# Patient Record
Sex: MALE | Race: WHITE | NOT HISPANIC OR LATINO | Employment: OTHER | URBAN - METROPOLITAN AREA
[De-identification: names, ages, dates, MRNs, and addresses within clinical notes are randomized per-mention and may not be internally consistent; named-entity substitution may affect disease eponyms.]

---

## 2019-12-11 RX ORDER — TAMSULOSIN HYDROCHLORIDE 0.4 MG/1
0.4 CAPSULE ORAL DAILY
Refills: 3 | COMMUNITY
Start: 2019-09-09

## 2019-12-11 RX ORDER — MELOXICAM 15 MG/1
15 TABLET ORAL DAILY
Refills: 0 | COMMUNITY
Start: 2019-09-05

## 2019-12-11 RX ORDER — VARENICLINE TARTRATE 1 MG/1
TABLET, FILM COATED ORAL
Refills: 2 | COMMUNITY
Start: 2019-10-17

## 2019-12-11 RX ORDER — CABERGOLINE 0.5 MG/1
TABLET ORAL
Refills: 3 | COMMUNITY
Start: 2019-11-19

## 2019-12-11 RX ORDER — MONTELUKAST SODIUM 10 MG/1
10 TABLET ORAL EVERY EVENING
Refills: 1 | COMMUNITY
Start: 2019-11-18

## 2019-12-11 RX ORDER — FLUTICASONE PROPIONATE 50 MCG
SPRAY, SUSPENSION (ML) NASAL
Refills: 2 | COMMUNITY
Start: 2019-09-06

## 2019-12-11 RX ORDER — HYDROMORPHONE HYDROCHLORIDE 2 MG/1
2 TABLET ORAL 3 TIMES DAILY PRN
Refills: 0 | COMMUNITY
Start: 2019-11-08

## 2019-12-11 RX ORDER — CLINDAMYCIN HYDROCHLORIDE 150 MG/1
CAPSULE ORAL
Refills: 0 | COMMUNITY
Start: 2019-10-31

## 2019-12-12 ENCOUNTER — PREP FOR PROCEDURE (OUTPATIENT)
Dept: GASTROENTEROLOGY | Facility: CLINIC | Age: 59
End: 2019-12-12

## 2019-12-12 ENCOUNTER — OFFICE VISIT (OUTPATIENT)
Dept: GASTROENTEROLOGY | Facility: CLINIC | Age: 59
End: 2019-12-12
Payer: COMMERCIAL

## 2019-12-12 VITALS
DIASTOLIC BLOOD PRESSURE: 66 MMHG | HEIGHT: 63 IN | WEIGHT: 186.8 LBS | BODY MASS INDEX: 33.1 KG/M2 | HEART RATE: 101 BPM | SYSTOLIC BLOOD PRESSURE: 98 MMHG

## 2019-12-12 DIAGNOSIS — K21.00 GASTROESOPHAGEAL REFLUX DISEASE WITH ESOPHAGITIS: ICD-10-CM

## 2019-12-12 DIAGNOSIS — R19.4 CHANGE IN BOWEL HABITS: Primary | ICD-10-CM

## 2019-12-12 PROCEDURE — 99213 OFFICE O/P EST LOW 20 MIN: CPT | Performed by: PHYSICIAN ASSISTANT

## 2019-12-12 RX ORDER — SIMVASTATIN 40 MG
TABLET ORAL
COMMUNITY
Start: 2019-12-11

## 2019-12-12 NOTE — PROGRESS NOTES
[facility] - Outpatient Consultation  Cy Remedies Ronnie 66 y o  male MRN: [id number]  Encounter: [id number]          ASSESSMENT AND PLAN:      1  Change in bowel habits  Update EGD and colonoscopy  Increase fiber supplement      ______________________________________________________________________    HPI:  66year old male presents for evaluation of change in bowel habits  He reports that over the past few months the stool caliber has decreased  He is taking fiber once daily  He denies diarrhea or constipation  He admits to a worsening left sided lower back pain which seems to improve after a BM  There is no abdominal pain or fevers  His last colonoscopy was in 2017 with several tubular adenomas removed  Prior to this he had a colonoscopy in 2015 with a large tubular adenoma removed and multiple other tubular adenomas removed  He also had an upper endoscopy in 2015 and 2017 with visualized Richey's esophagus however biopsies were negative  REVIEW OF SYSTEMS:    CONSTITUTIONAL: Denies any fever, chills, rigors, and weight loss  HEENT: No earache or tinnitus  Denies hearing loss or visual disturbances  CARDIOVASCULAR: No chest pain or palpitations  RESPIRATORY: Denies any cough, hemoptysis, shortness of breath or dyspnea on exertion  GASTROINTESTINAL: As noted in the History of Present Illness  GENITOURINARY: No problems with urination  Denies any hematuria or dysuria  NEUROLOGIC: No dizziness or vertigo, denies headaches  MUSCULOSKELETAL: Denies any muscle or joint pain  SKIN: Denies skin rashes or itching  ENDOCRINE: Denies excessive thirst  Denies intolerance to heat or cold  PSYCHOSOCIAL: Denies depression or anxiety  Denies any recent memory loss         Historical Information   Past Medical History:   Diagnosis Date    Brain tumor ([facility] [address] )     Low BP      Past Surgical History:   Procedure Laterality Date    NASAL SINUS SURGERY      NECK SURGERY  2003 Social History   Social History     Substance and Sexual Activity   Alcohol Use Not Currently     Social History     Substance and Sexual Activity   Drug Use Never     Social History     Tobacco Use   Smoking Status Current Some Day Smoker   Smokeless Tobacco Never Used     Family History   Problem Relation Age of Onset    Arthritis Mother     Brain cancer Father        Meds/Allergies       Current Outpatient Medications:     BREO ELLIPTA 200-25 MCG/INH inhaler    cabergoline (DOSTINEX) 0 5 MG tablet    CHANTIX 1 MG tablet    fluticasone (FLONASE) 50 mcg/act nasal spray    HYDROmorphone (DILAUDID) 2 mg tablet    tamsulosin (FLOMAX) 0 4 mg    clindamycin (CLEOCIN) 150 mg capsule    meloxicam (MOBIC) 15 mg tablet    montelukast (SINGULAIR) 10 mg tablet    simvastatin (ZOCOR) 40 mg tablet    Allergies   Allergen Reactions    Bee Pollen Other (See Comments)    Bee Venom      Other reaction(s): Unknown           Objective     Blood pressure 98/66, pulse 101, height 5' 3" (1 6 m), weight 84 7 kg (186 lb 12 8 oz)  Body mass index is 33 09 kg/m²  PHYSICAL EXAM:      General Appearance:   Alert, cooperative, no distress   HEENT:   Normocephalic, atraumatic, anicteric      Neck:  Supple, symmetrical, trachea midline   Lungs:   Clear to auscultation bilaterally; no rales, rhonchi or wheezing; respirations unlabored    Heart[de-identified]   Regular rate and rhythm; no murmur, rub, or gallop  Abdomen:   Soft, non-tender, non-distended; normal bowel sounds; no masses, no organomegaly    Genitalia:   Deferred    Rectal:   Deferred    Extremities:  No cyanosis, clubbing or edema    Pulses:  2+ and symmetric    Skin:  No jaundice, rashes, or lesions    Lymph nodes:  No palpable cervical lymphadenopathy        Lab Results:   No visits with results within 1 Day(s) from this visit  Latest known visit with results is:   No results found for any previous visit  Radiology Results:   No results found

## 2019-12-19 ENCOUNTER — TELEPHONE (OUTPATIENT)
Dept: GASTROENTEROLOGY | Facility: CLINIC | Age: 59
End: 2019-12-19

## 2019-12-19 NOTE — TELEPHONE ENCOUNTER
Patient called lmom - scheduled for colonoscopy on Monday 12/23/19 has a question   Please call Deann Camacho at [phone] ty

## 2019-12-30 ENCOUNTER — TELEPHONE (OUTPATIENT)
Dept: GASTROENTEROLOGY | Facility: CLINIC | Age: 59
End: 2019-12-30

## 2019-12-30 NOTE — TELEPHONE ENCOUNTER
Patient called lmom - will not be available for colonoscopy on 01/02/20    Please call Yong Almonte to reschedule at [phone] ty

## 2020-05-11 RX ORDER — ALBUTEROL SULFATE 90 UG/1
AEROSOL, METERED RESPIRATORY (INHALATION)
COMMUNITY
Start: 2017-09-29

## 2020-05-14 ENCOUNTER — PREP FOR PROCEDURE (OUTPATIENT)
Dept: GASTROENTEROLOGY | Facility: CLINIC | Age: 60
End: 2020-05-14

## 2020-05-14 ENCOUNTER — OFFICE VISIT (OUTPATIENT)
Dept: GASTROENTEROLOGY | Facility: CLINIC | Age: 60
End: 2020-05-14
Payer: COMMERCIAL

## 2020-05-14 VITALS
HEART RATE: 66 BPM | SYSTOLIC BLOOD PRESSURE: 100 MMHG | BODY MASS INDEX: 33.88 KG/M2 | HEIGHT: 63 IN | DIASTOLIC BLOOD PRESSURE: 70 MMHG | WEIGHT: 191.2 LBS

## 2020-05-14 DIAGNOSIS — R19.5 CHANGE IN STOOL CALIBER: ICD-10-CM

## 2020-05-14 DIAGNOSIS — Z86.010 HISTORY OF COLON POLYPS: ICD-10-CM

## 2020-05-14 DIAGNOSIS — Z20.822 ENCOUNTER FOR LABORATORY TESTING FOR COVID-19 VIRUS: ICD-10-CM

## 2020-05-14 DIAGNOSIS — R13.19 ESOPHAGEAL DYSPHAGIA: Primary | ICD-10-CM

## 2020-05-14 PROCEDURE — 99214 OFFICE O/P EST MOD 30 MIN: CPT | Performed by: INTERNAL MEDICINE

## 2020-05-14 RX ORDER — GLUCOSAMINE HCL 500 MG
TABLET ORAL
COMMUNITY

## 2020-05-14 RX ORDER — SACCHAROMYCES BOULARDII 250 MG
250 CAPSULE ORAL 2 TIMES DAILY
COMMUNITY

## 2020-05-19 DIAGNOSIS — Z20.822 ENCOUNTER FOR LABORATORY TESTING FOR COVID-19 VIRUS: ICD-10-CM

## 2020-05-19 PROCEDURE — U0003 INFECTIOUS AGENT DETECTION BY NUCLEIC ACID (DNA OR RNA); SEVERE ACUTE RESPIRATORY SYNDROME CORONAVIRUS 2 (SARS-COV-2) (CORONAVIRUS DISEASE [COVID-19]), AMPLIFIED PROBE TECHNIQUE, MAKING USE OF HIGH THROUGHPUT TECHNOLOGIES AS DESCRIBED BY CMS-2020-01-R: HCPCS

## 2020-05-21 ENCOUNTER — TELEPHONE (OUTPATIENT)
Dept: GASTROENTEROLOGY | Facility: CLINIC | Age: 60
End: 2020-05-21

## 2020-05-21 LAB — SARS-COV-2 RNA SPEC QL NAA+PROBE: NOT DETECTED

## 2020-05-22 ENCOUNTER — PREP FOR PROCEDURE (OUTPATIENT)
Dept: GASTROENTEROLOGY | Facility: CLINIC | Age: 60
End: 2020-05-22

## 2020-05-22 DIAGNOSIS — Z20.822 ENCOUNTER FOR LABORATORY TESTING FOR COVID-19 VIRUS: Primary | ICD-10-CM

## 2020-06-05 DIAGNOSIS — Z20.822 ENCOUNTER FOR LABORATORY TESTING FOR COVID-19 VIRUS: ICD-10-CM

## 2020-06-05 PROCEDURE — U0003 INFECTIOUS AGENT DETECTION BY NUCLEIC ACID (DNA OR RNA); SEVERE ACUTE RESPIRATORY SYNDROME CORONAVIRUS 2 (SARS-COV-2) (CORONAVIRUS DISEASE [COVID-19]), AMPLIFIED PROBE TECHNIQUE, MAKING USE OF HIGH THROUGHPUT TECHNOLOGIES AS DESCRIBED BY CMS-2020-01-R: HCPCS

## 2020-06-07 LAB — SARS-COV-2 RNA SPEC QL NAA+PROBE: NOT DETECTED

## 2020-06-10 ENCOUNTER — ANESTHESIA EVENT (OUTPATIENT)
Dept: GASTROENTEROLOGY | Facility: HOSPITAL | Age: 60
End: 2020-06-10

## 2020-06-11 ENCOUNTER — ANESTHESIA (OUTPATIENT)
Dept: GASTROENTEROLOGY | Facility: HOSPITAL | Age: 60
End: 2020-06-11

## 2020-06-11 ENCOUNTER — HOSPITAL ENCOUNTER (OUTPATIENT)
Dept: GASTROENTEROLOGY | Facility: HOSPITAL | Age: 60
Setting detail: OUTPATIENT SURGERY
Discharge: HOME | End: 2020-06-11
Attending: INTERNAL MEDICINE | Admitting: INTERNAL MEDICINE
Payer: COMMERCIAL

## 2020-06-11 VITALS
HEIGHT: 63 IN | WEIGHT: 181 LBS | TEMPERATURE: 97.3 F | DIASTOLIC BLOOD PRESSURE: 76 MMHG | SYSTOLIC BLOOD PRESSURE: 117 MMHG | RESPIRATION RATE: 24 BRPM | BODY MASS INDEX: 32.07 KG/M2 | HEART RATE: 77 BPM | OXYGEN SATURATION: 98 %

## 2020-06-11 DIAGNOSIS — R19.5 CHANGE IN STOOL CALIBER: ICD-10-CM

## 2020-06-11 DIAGNOSIS — R13.19 ESOPHAGEAL DYSPHAGIA: ICD-10-CM

## 2020-06-11 DIAGNOSIS — Z86.010 HISTORY OF COLON POLYPS: ICD-10-CM

## 2020-06-11 PROCEDURE — 43248 EGD GUIDE WIRE INSERTION: CPT | Performed by: INTERNAL MEDICINE

## 2020-06-11 PROCEDURE — 88305 TISSUE EXAM BY PATHOLOGIST: CPT | Performed by: PATHOLOGY

## 2020-06-11 PROCEDURE — NC001 PR NO CHARGE: Performed by: INTERNAL MEDICINE

## 2020-06-11 PROCEDURE — 45385 COLONOSCOPY W/LESION REMOVAL: CPT | Performed by: INTERNAL MEDICINE

## 2020-06-11 RX ORDER — LIDOCAINE HYDROCHLORIDE 10 MG/ML
INJECTION, SOLUTION EPIDURAL; INFILTRATION; INTRACAUDAL; PERINEURAL AS NEEDED
Status: DISCONTINUED | OUTPATIENT
Start: 2020-06-11 | End: 2020-06-11 | Stop reason: SURG

## 2020-06-11 RX ORDER — PROPOFOL 10 MG/ML
INJECTION, EMULSION INTRAVENOUS AS NEEDED
Status: DISCONTINUED | OUTPATIENT
Start: 2020-06-11 | End: 2020-06-11 | Stop reason: SURG

## 2020-06-11 RX ORDER — SODIUM CHLORIDE, SODIUM LACTATE, POTASSIUM CHLORIDE, CALCIUM CHLORIDE 600; 310; 30; 20 MG/100ML; MG/100ML; MG/100ML; MG/100ML
100 INJECTION, SOLUTION INTRAVENOUS CONTINUOUS
Status: DISCONTINUED | OUTPATIENT
Start: 2020-06-11 | End: 2020-06-15 | Stop reason: HOSPADM

## 2020-06-11 RX ORDER — METHYLPREDNISOLONE 4 MG/1
4 TABLET ORAL 2 TIMES DAILY
COMMUNITY

## 2020-06-11 RX ADMIN — PROPOFOL 20 MG: 10 INJECTION, EMULSION INTRAVENOUS at 07:36

## 2020-06-11 RX ADMIN — PROPOFOL 100 MG: 10 INJECTION, EMULSION INTRAVENOUS at 07:20

## 2020-06-11 RX ADMIN — PROPOFOL 20 MG: 10 INJECTION, EMULSION INTRAVENOUS at 07:30

## 2020-06-11 RX ADMIN — SODIUM CHLORIDE, SODIUM LACTATE, POTASSIUM CHLORIDE, AND CALCIUM CHLORIDE 100 ML/HR: .6; .31; .03; .02 INJECTION, SOLUTION INTRAVENOUS at 06:48

## 2020-06-11 RX ADMIN — PROPOFOL 20 MG: 10 INJECTION, EMULSION INTRAVENOUS at 07:33

## 2020-06-11 RX ADMIN — LIDOCAINE HYDROCHLORIDE 50 MG: 10 INJECTION, SOLUTION EPIDURAL; INFILTRATION; INTRACAUDAL; PERINEURAL at 07:20

## 2020-06-11 RX ADMIN — PROPOFOL 20 MG: 10 INJECTION, EMULSION INTRAVENOUS at 07:28

## 2020-06-11 RX ADMIN — PROPOFOL 20 MG: 10 INJECTION, EMULSION INTRAVENOUS at 07:23

## 2020-06-11 RX ADMIN — PROPOFOL 20 MG: 10 INJECTION, EMULSION INTRAVENOUS at 07:26

## 2020-08-27 ENCOUNTER — HOSPITAL ENCOUNTER (EMERGENCY)
Facility: HOSPITAL | Age: 60
Discharge: HOME | End: 2020-08-27
Attending: EMERGENCY MEDICINE | Admitting: EMERGENCY MEDICINE
Payer: COMMERCIAL

## 2020-08-27 VITALS
OXYGEN SATURATION: 97 % | BODY MASS INDEX: 32.02 KG/M2 | RESPIRATION RATE: 15 BRPM | DIASTOLIC BLOOD PRESSURE: 79 MMHG | TEMPERATURE: 98.3 F | WEIGHT: 180.78 LBS | SYSTOLIC BLOOD PRESSURE: 125 MMHG | HEART RATE: 62 BPM

## 2020-08-27 DIAGNOSIS — T78.40XA ALLERGIC REACTION, INITIAL ENCOUNTER: ICD-10-CM

## 2020-08-27 DIAGNOSIS — T63.441A BEE STING: Primary | ICD-10-CM

## 2020-08-27 PROCEDURE — 99284 EMERGENCY DEPT VISIT MOD MDM: CPT | Performed by: PHYSICIAN ASSISTANT

## 2020-08-27 PROCEDURE — 99283 EMERGENCY DEPT VISIT LOW MDM: CPT

## 2020-08-27 PROCEDURE — 96374 THER/PROPH/DIAG INJ IV PUSH: CPT

## 2020-08-27 RX ORDER — METHYLPREDNISOLONE SODIUM SUCCINATE 125 MG/2ML
125 INJECTION, POWDER, LYOPHILIZED, FOR SOLUTION INTRAMUSCULAR; INTRAVENOUS ONCE
Status: COMPLETED | OUTPATIENT
Start: 2020-08-27 | End: 2020-08-27

## 2020-08-27 RX ORDER — EPINEPHRINE 0.3 MG/.3ML
0.3 INJECTION SUBCUTANEOUS ONCE
Qty: 0.6 ML | Refills: 0 | Status: SHIPPED | OUTPATIENT
Start: 2020-08-27 | End: 2020-08-27

## 2020-08-27 RX ORDER — FAMOTIDINE 20 MG/1
20 TABLET, FILM COATED ORAL ONCE
Status: COMPLETED | OUTPATIENT
Start: 2020-08-27 | End: 2020-08-27

## 2020-08-27 RX ADMIN — FAMOTIDINE 20 MG: 20 TABLET ORAL at 13:01

## 2020-08-27 RX ADMIN — METHYLPREDNISOLONE SODIUM SUCCINATE 125 MG: 125 INJECTION, POWDER, FOR SOLUTION INTRAMUSCULAR; INTRAVENOUS at 13:01

## 2020-08-27 NOTE — ED PROVIDER NOTES
History  Chief Complaint   Patient presents with    Bee Sting     Pt states he was stung by a bee on his bottom lip  Pt is allergic and states he rank about a half of bottle of childrens benadryl      Patient is a 57-year-old male who presents to the ED with complaints of a bee sting that occurred about 1 hour prior to arrival in the emergency department  Patient admits to a known allergy to bee stings for which he states he gets localized swelling  Patient states he was outside doing yard work when he was stung by a bee on his bottom lip  He states he noticed immediate swelling  His wife gave him children's benadryl, which he drank half the botle of  Pt states he has an  epi pen which he did not use and never has had to use  They went to an urgent care and were sent to the ED for further evaluation and care  Pt denies any wheezing, SOB/difficutly breathing, stridor, tongue swelling/itching, drooling/inability to handle his own secretion, abdominal pain, nausea, vomiting, diarrhea, chest pain, dizziness, light-headedness, and syncopal episodes  History provided by:  Patient and spouse   used: No        Prior to Admission Medications   Prescriptions Last Dose Informant Patient Reported? Taking? BREO ELLIPTA 200-25 MCG/INH inhaler  Self Yes No   Sig: INHALE 1 PUFF BY INHALATION ROUTE ONCE DAILY AT THE SAME TIME EACH DAY   CHANTIX 1 MG tablet  Self Yes No   Sig: TAKE 1 TABLET BY MOUTH 2 TIMES A DAY  TAKE WITH FULL GLASS OF WATER  Cranberry 400 MG TABS  Self Yes No   Sig: Take by mouth   HYDROmorphone (DILAUDID) 2 mg tablet  Self Yes No   Sig: Take 2 mg by mouth 3 (three) times a day as needed   albuterol (Ventolin HFA) 90 mcg/act inhaler  Self Yes No   Sig: Ventolin HFA 90 mcg/actuation aerosol inhaler   cabergoline (DOSTINEX) 0 5 MG tablet  Self Yes No   Sig: TAKE 1 TABLET (0 5 MG TOTAL) BY MOUTH ONCE A WEEK     clindamycin (CLEOCIN) 150 mg capsule  Self Yes No   Sig: TAKE 2 CAPSULES STAT THEN 1 TABLET 3 TIMES A DAY   co-enzyme Q-10 30 mg  Self Yes No   Sig: Take 30 mg by mouth Three times a day   fluticasone (FLONASE) 50 mcg/act nasal spray  Self Yes No   Sig: INSTILL ONE SPRAY INTO EACH NOSTRIL TWICE DAILY   meloxicam (MOBIC) 15 mg tablet  Self Yes No   Sig: Take 15 mg by mouth daily As directed   methylprednisolone (MEDROL) 4 mg tablet   Yes No   Sig: Take 4 mg by mouth 2 (two) times a day   montelukast (SINGULAIR) 10 mg tablet  Self Yes No   Sig: Take 10 mg by mouth every evening   saccharomyces boulardii (Florastor) 250 mg capsule  Self Yes No   Sig: Take 250 mg by mouth 2 (two) times a day   simvastatin (ZOCOR) 40 mg tablet  Self Yes No   tamsulosin (FLOMAX) 0 4 mg  Self Yes No   Sig: Take 0 4 mg by mouth daily      Facility-Administered Medications: None       Past Medical History:   Diagnosis Date    Asthma     Brain tumor (HCC)     Colon polyp     Hyperlipidemia     Low BP     Prostate enlargement        Past Surgical History:   Procedure Laterality Date    COLONOSCOPY      EGD      NASAL SINUS SURGERY      NECK SURGERY  2003       Family History   Problem Relation Age of Onset    Arthritis Mother     Brain cancer Father      I have reviewed and agree with the history as documented  E-Cigarette/Vaping    E-Cigarette Use Current Some Day User      E-Cigarette/Vaping Substances    Nicotine No     THC No     CBD No     Flavoring Yes     Other No     Unknown No      Social History     Tobacco Use    Smoking status: Current Every Day Smoker     Packs/day: 1 00    Smokeless tobacco: Never Used   Substance Use Topics    Alcohol use: Not Currently    Drug use: Never       Review of Systems   Constitutional: Negative for chills and fever  HENT: Positive for facial swelling  Negative for congestion, dental problem, drooling, hearing loss, mouth sores, postnasal drip, rhinorrhea, sinus pressure, sinus pain, sore throat, trouble swallowing and voice change  Eyes: Negative for photophobia, pain, itching and visual disturbance  Respiratory: Negative for cough, choking, chest tightness, shortness of breath, wheezing and stridor  Cardiovascular: Negative for chest pain and palpitations  Gastrointestinal: Negative for abdominal pain, constipation, diarrhea, nausea and vomiting  Musculoskeletal: Negative for back pain, gait problem, joint swelling, myalgias and neck pain  Skin: Negative for color change, pallor, rash and wound  Neurological: Negative for dizziness, tremors, syncope, speech difficulty, weakness, light-headedness, numbness and headaches  Psychiatric/Behavioral: Negative for agitation, behavioral problems, confusion and decreased concentration  The patient is not nervous/anxious  Physical Exam  Physical Exam  Constitutional:       General: He is not in acute distress  Appearance: Normal appearance  He is normal weight  He is not ill-appearing, toxic-appearing or diaphoretic  Comments: Well appearing, middle aged male resting comfortably on the stretcher  No acute distress   Appears tired    HENT:      Head: Normocephalic and atraumatic  Nose: Nose normal  No congestion or rhinorrhea  Mouth/Throat:      Mouth: Mucous membranes are moist       Pharynx: Oropharynx is clear  No oropharyngeal exudate or posterior oropharyngeal erythema  Comments: Diffuse swelling noted to lower lip   No drooling or difficulty handling secretions   Eyes:      General: No scleral icterus  Right eye: No discharge  Left eye: No discharge  Extraocular Movements: Extraocular movements intact  Conjunctiva/sclera: Conjunctivae normal       Pupils: Pupils are equal, round, and reactive to light  Neck:      Musculoskeletal: Normal range of motion and neck supple  Cardiovascular:      Rate and Rhythm: Normal rate and regular rhythm  Pulses: Normal pulses  Heart sounds: No murmur     Pulmonary:      Effort: Pulmonary effort is normal  No respiratory distress  Breath sounds: Normal breath sounds  No stridor  No wheezing, rhonchi or rales  Comments: In no respiratory distress, maintaining airway without difficulty   No stridorous breath sounds   No wheezes, rhonchi,or rales   Maintaining adequate O2 sat on room air   Abdominal:      General: Abdomen is flat  There is no distension  Palpations: Abdomen is soft  Tenderness: There is no abdominal tenderness  Musculoskeletal: Normal range of motion  General: Swelling present  No tenderness, deformity or signs of injury  Right lower leg: No edema  Left lower leg: No edema  Comments: See above    Skin:     General: Skin is warm and dry  Capillary Refill: Capillary refill takes less than 2 seconds  Findings: No rash  Comments: See above   No rashes appreciated    Neurological:      General: No focal deficit present  Mental Status: He is alert and oriented to person, place, and time  Mental status is at baseline  Psychiatric:         Mood and Affect: Mood normal          Behavior: Behavior normal          Thought Content:  Thought content normal          Judgment: Judgment normal          Vital Signs  ED Triage Vitals [08/27/20 1245]   Temperature Pulse Respirations Blood Pressure SpO2   98 3 °F (36 8 °C) 64 20 120/75 98 %      Temp Source Heart Rate Source Patient Position - Orthostatic VS BP Location FiO2 (%)   Oral Monitor -- Right arm --      Pain Score       --           Vitals:    08/27/20 1245 08/27/20 1300 08/27/20 1400   BP: 120/75 134/78 125/79   Pulse: 64 61 62         Visual Acuity      ED Medications  Medications   methylPREDNISolone sodium succinate (Solu-MEDROL) injection 125 mg (125 mg Intravenous Given 8/27/20 1301)   famotidine (PEPCID) tablet 20 mg (20 mg Oral Given 8/27/20 1301)       Diagnostic Studies  Results Reviewed     None                 No orders to display Procedures  Procedures         ED Course       US AUDIT      Most Recent Value   Initial Alcohol Screen: US AUDIT-C    1  How often do you have a drink containing alcohol?  0 Filed at: 08/27/2020 1251   2  How many drinks containing alcohol do you have on a typical day you are drinking? 0 Filed at: 08/27/2020 1251   3a  Male UNDER 65: How often do you have five or more drinks on one occasion? 0 Filed at: 08/27/2020 1251   3b  FEMALE Any Age, or MALE 65+: How often do you have 4 or more drinks on one occassion? 0 Filed at: 08/27/2020 1251   Audit-C Score  0 Filed at: 08/27/2020 1251                  JAY/DAST-10      Most Recent Value   How many times in the past year have you    Used an illegal drug or used a prescription medication for non-medical reasons? Never Filed at: 08/27/2020 1251                                MDM  Number of Diagnoses or Management Options  Allergic reaction, initial encounter: new and does not require workup  Bee sting: new and does not require workup  Diagnosis management comments: Patient is a 61-year-old male who presented to the ED with complaints of a bee sting to his bottom lip that occurred about 1 hour prior to arrival in the emergency department  Reports hx of bee sting allergy for which he gets localized swelling  Pt took half a bottle of benadryl prior to arrival for his symptoms  Denies any difficulty breathing, shortness of breath, wheezing, stridor, tongue swelling, facial swelling, inability to handle secretions, abdominal pain, diarrhea  On exam, VSS  Pt noted to have lower lip swelling  Maintaining airway without difficulty  No wheezes or stridor noted  No rashes  Heart RRR  Pt alert and oriented x 3, but appears tired likely 2/2 to benadryl he took prior to arrival      Given hx and physical exam and no signs of anaphylaxis, will treat with PO pepcid and IV steroids   Will hold on additional benadryl given the pt took it prior to arrival and appears more tired  Will monitor the pt for at least 2 hrs here in the ED  Pt was reassessed and stated he was feeling better  The swelling to his bottom lip had significantly decreased  Pt stated he was ready for discharge  Pt's vitals were reassessed and remained stable  A refill for the pt's epipen was sent to the pt's pharmacy  Pt was encouraged to follow-up with his PCP in regard to his ED visit today  Pt then discharged to home with his wife  Pt's wife was the   Amount and/or Complexity of Data Reviewed  Review and summarize past medical records: yes  Discuss the patient with other providers: yes    Risk of Complications, Morbidity, and/or Mortality  Presenting problems: low  Diagnostic procedures: low  Management options: low    Patient Progress  Patient progress: stable        Disposition  Final diagnoses:   Bee sting   Allergic reaction, initial encounter     Time reflects when diagnosis was documented in both MDM as applicable and the Disposition within this note     Time User Action Codes Description Comment    8/27/2020  2:38 PM Raheem Neer Add [D88 296U] Allergic reaction to bee sting     8/27/2020  2:38 PM Raheem Neer Remove [Q91 461M] Allergic reaction to bee sting     8/27/2020  2:38 PM Tolland Neer Add [Z33 704N] Bee sting     8/27/2020  2:38 PM Tolland Neer Add [T78 40XA] Allergic reaction, initial encounter       ED Disposition     ED Disposition Condition Date/Time Comment    Discharge Stable Thu Aug 27, 2020  2:38 PM [address] discharge to home/self care  Follow-up Information     Follow up With Specialties Details Why Vonnie Collazo MD Family Medicine Schedule an appointment as soon as possible for a visit in 1 week Please schedule an appointment with your PCP in regard to your ED visit today   [address] [place] [address]            Discharge Medication List as of 8/27/2020  2:48 PM      START taking these medications    Details   EPINEPHrine (EPIPEN) 0 3 mg/0 3 mL SOAJ Inject 0 3 mL (0 3 mg total) into a muscle once for 1 dose, Starting Thu 8/27/2020, Normal         CONTINUE these medications which have NOT CHANGED    Details   albuterol (Ventolin HFA) 90 mcg/act inhaler Ventolin HFA 90 mcg/actuation aerosol inhaler, Historical Med      BREO ELLIPTA 200-25 MCG/INH inhaler INHALE 1 PUFF BY INHALATION ROUTE ONCE DAILY AT THE SAME TIME EACH DAY, Historical Med      cabergoline (DOSTINEX) 0 5 MG tablet TAKE 1 TABLET (0 5 MG TOTAL) BY MOUTH ONCE A WEEK , Historical Med      CHANTIX 1 MG tablet TAKE 1 TABLET BY MOUTH 2 TIMES A DAY  TAKE WITH FULL GLASS OF WATER , Historical Med      clindamycin (CLEOCIN) 150 mg capsule TAKE 2 CAPSULES STAT THEN 1 TABLET 3 TIMES A DAY, Historical Med      co-enzyme Q-10 30 mg Take 30 mg by mouth Three times a day, Historical Med      Cranberry 400 MG TABS Take by mouth, Historical Med      fluticasone (FLONASE) 50 mcg/act nasal spray INSTILL ONE SPRAY INTO EACH NOSTRIL TWICE DAILY, Historical Med      HYDROmorphone (DILAUDID) 2 mg tablet Take 2 mg by mouth 3 (three) times a day as needed, Starting Fri 11/8/2019, Historical Med      meloxicam (MOBIC) 15 mg tablet Take 15 mg by mouth daily As directed, Starting Thu 9/5/2019, Historical Med      methylprednisolone (MEDROL) 4 mg tablet Take 4 mg by mouth 2 (two) times a day, Historical Med      montelukast (SINGULAIR) 10 mg tablet Take 10 mg by mouth every evening, Starting Mon 11/18/2019, Historical Med      saccharomyces boulardii (Florastor) 250 mg capsule Take 250 mg by mouth 2 (two) times a day, Historical Med      simvastatin (ZOCOR) 40 mg tablet Starting Wed 12/11/2019, Historical Med      tamsulosin (FLOMAX) 0 4 mg Take 0 4 mg by mouth daily, Starting Mon 9/9/2019, Historical Med           No discharge procedures on file      PDMP Review     None          ED Provider  Electronically Signed by           Jules Jaramillo ADOLPH  08/27/20 1500

## 2020-08-27 NOTE — ED NOTES
Pt arrives after being stung by bee on bottom lip  Skin appears intact, swollen  States it is painful to swallow  In NAD, 97% on RA  Took 1/2 bottle liquid benadryl PTA  Hx of allergy to bees but epi pen is        Jacek Segovia RN  [phone]

## 2020-08-27 NOTE — ED ATTENDING ATTESTATION
8/27/2020  IChana MD, saw and evaluated the patient  I have discussed the patient with the resident/non-physician practitioner and agree with the resident's/non-physician practitioner's findings, Plan of Care, and MDM as documented in the resident's/non-physician practitioner's note, except where noted  All available labs and Radiology studies were reviewed  I was present for key portions of any procedure(s) performed by the resident/non-physician practitioner and I was immediately available to provide assistance  At this point I agree with the current assessment done in the Emergency Department  I have conducted an independent evaluation of this patient a history and physical is as follows:    ED Course       65 yo M presents with bee sting to lower lip that happened just prior to arrival  Patient took benadryl  No difficulty breathing or swallowing  No tongue swelling  No history of anaphylaxis to bees however does have epi pen as he had swelling of his arm after getting stung by a bee in the past, did not use this time  Will plan for observation, steroid/pepcid    General: VSS, NAD, awake, alert  Well-nourished, well-developed  Appears stated age  Speaking normally in full sentences  Head: Normocephalic, atraumatic, nontender  Eyes: PERRL, EOM-I  No diplopia  No hyphema  No subconjunctival hemorrhages  Symmetrical lids  ENT: Atraumatic external nose and ears  MMM  No malocclusion  No stridor  Normal phonation  No drooling  Normal swallowing, lower lip edematous no tongue swelling, no pharyngeal swelling   Neck: Symmetric, trachea midline  No JVD  CV: RRR  +S1/S2  No murmurs or gallops  Peripheral pulses +2 throughout  No chest wall tenderness  Lungs:   Unlabored No retractions  CTAB, lungs sounds equal bilateral    No tachypnea  Abd: +BS, soft, NT/ND    MSK:   FROM   Back:   No rashes  Skin: Dry, intact  Neuro: AAOx3, GCS 15, CN II-XII grossly intact     Motor grossly intact    Psychiatric/Behavioral: Appropriate mood and affect   Exam: deferred    Critical Care Time  Procedures

## 2020-08-27 NOTE — DISCHARGE INSTRUCTIONS
You were noted to have an allergic reaction to a bee sting to your bottom lip  You were given a dose of IV steroids and Pepcid here in the emergency department  You took benadryl on your own prior to arrival  A prescription for an Epipen was sent to your pharmacy  Please follow-up with your primary care provider in regard to your emergency department visit today  Please return to the emergency department should you experience worsening symptoms including worsening swelling, inability to have a your secretions, difficulty swallowing, drooling, chest tightness, wheezing, difficulty breathing, facial swelling, and tongue swelling

## 2021-01-21 DIAGNOSIS — Z23 ENCOUNTER FOR IMMUNIZATION: ICD-10-CM

## 2021-01-25 ENCOUNTER — IMMUNIZATIONS (OUTPATIENT)
Dept: FAMILY MEDICINE CLINIC | Facility: HOSPITAL | Age: 61
End: 2021-01-25

## 2021-01-25 DIAGNOSIS — Z23 ENCOUNTER FOR IMMUNIZATION: Primary | ICD-10-CM

## 2021-01-25 PROCEDURE — 91301 SARS-COV-2 / COVID-19 MRNA VACCINE (MODERNA) 100 MCG: CPT

## 2021-01-25 PROCEDURE — 0011A SARS-COV-2 / COVID-19 MRNA VACCINE (MODERNA) 100 MCG: CPT

## 2021-02-20 ENCOUNTER — IMMUNIZATIONS (OUTPATIENT)
Dept: FAMILY MEDICINE CLINIC | Facility: HOSPITAL | Age: 61
End: 2021-02-20

## 2021-02-20 DIAGNOSIS — Z23 ENCOUNTER FOR IMMUNIZATION: Primary | ICD-10-CM

## 2021-02-20 PROCEDURE — 0012A SARS-COV-2 / COVID-19 MRNA VACCINE (MODERNA) 100 MCG: CPT | Performed by: PEDIATRICS

## 2021-02-20 PROCEDURE — 91301 SARS-COV-2 / COVID-19 MRNA VACCINE (MODERNA) 100 MCG: CPT | Performed by: PEDIATRICS

## 2021-12-08 ENCOUNTER — IMMUNIZATIONS (OUTPATIENT)
Dept: FAMILY MEDICINE CLINIC | Facility: HOSPITAL | Age: 61
End: 2021-12-08

## 2021-12-08 DIAGNOSIS — Z23 ENCOUNTER FOR IMMUNIZATION: Primary | ICD-10-CM

## 2021-12-08 PROCEDURE — 0064A COVID-19 MODERNA VACC 0.25 ML BOOSTER: CPT

## 2021-12-08 PROCEDURE — 91306 COVID-19 MODERNA VACC 0.25 ML BOOSTER: CPT

## 2023-06-22 RX ORDER — CETIRIZINE HYDROCHLORIDE 10 MG/1
10 TABLET ORAL DAILY
COMMUNITY

## 2023-06-22 RX ORDER — FAMOTIDINE 20 MG/1
TABLET, FILM COATED ORAL
COMMUNITY

## 2023-06-22 RX ORDER — IBUPROFEN 800 MG/1
TABLET ORAL
COMMUNITY

## 2023-06-22 RX ORDER — FINASTERIDE 5 MG/1
TABLET, FILM COATED ORAL
COMMUNITY

## 2023-06-22 RX ORDER — ERGOCALCIFEROL 1.25 MG/1
CAPSULE ORAL
COMMUNITY

## 2023-06-23 ENCOUNTER — OFFICE VISIT (OUTPATIENT)
Dept: GASTROENTEROLOGY | Facility: CLINIC | Age: 63
End: 2023-06-23
Payer: COMMERCIAL

## 2023-06-23 VITALS
HEART RATE: 81 BPM | HEIGHT: 62 IN | WEIGHT: 204.6 LBS | BODY MASS INDEX: 37.65 KG/M2 | OXYGEN SATURATION: 94 % | SYSTOLIC BLOOD PRESSURE: 100 MMHG | DIASTOLIC BLOOD PRESSURE: 68 MMHG

## 2023-06-23 DIAGNOSIS — K21.9 GASTROESOPHAGEAL REFLUX DISEASE WITHOUT ESOPHAGITIS: ICD-10-CM

## 2023-06-23 DIAGNOSIS — R19.5 CHANGE IN STOOL CALIBER: ICD-10-CM

## 2023-06-23 DIAGNOSIS — Z86.010 HISTORY OF COLON POLYPS: ICD-10-CM

## 2023-06-23 DIAGNOSIS — R13.19 ESOPHAGEAL DYSPHAGIA: Primary | ICD-10-CM

## 2023-06-23 PROCEDURE — 99204 OFFICE O/P NEW MOD 45 MIN: CPT | Performed by: INTERNAL MEDICINE

## 2023-06-23 RX ORDER — PANTOPRAZOLE SODIUM 40 MG/1
40 TABLET, DELAYED RELEASE ORAL
Qty: 62 TABLET | Refills: 6 | Status: SHIPPED | OUTPATIENT
Start: 2023-06-23

## 2023-06-23 NOTE — PATIENT INSTRUCTIONS
Scheduled date of EGD/colonoscopy (as of today):9/7/23  Physician performing EGD/colonoscopy:Mavis  Location of EGD/colonoscopy:[facility] bowel prep reviewed with patient:miralax/dulcolax  Instructions reviewed with patient by:Tita HAAS  Clearances:   none

## 2023-06-23 NOTE — PROGRESS NOTES
[facility] - Outpatient Consultation  Jaycee Trujillo 63 y o  male MRN: [id number]  Encounter: [id number]          ASSESSMENT AND PLAN:      1  Esophageal dysphagia  - EGD; Future    2  Change in stool caliber  - Colonoscopy; Future    3  History of colon polyps  - Colonoscopy; Future    4  Gastroesophageal reflux disease without esophagitis  - pantoprazole (PROTONIX) 40 mg tablet; Take 1 tablet (40 mg total) by mouth daily before breakfast  Dispense: 62 tablet; Refill: 6  - EGD; Future    ______________________________________________________________________    HPI: Elliot Lundborg returns the office today for routine follow-up  He still complaining about issues with constipation  He feels like he does not empty completely  He has had an ongoing problem with a change in the stool caliber  His last colonoscopy was performed in June 2020  It demonstrated diverticulosis coli as well as for polyp  3 of the 4 polyps were tubular adenomas  Patient also has problems with difficulty swallowing  He had the same problem in June 2020 and underwent esophagogastroduodenoscopy with dilation with a 54 Kisw[place]ili savory dilator  This resolved his symptoms for a long period of time  He has had recurrence of dysphagia primarily with pills getting stuck  He is also experiencing lower abdominal pain with associated bloating  He denies diarrhea  He does admit to heartburn almost on a daily basis for which he takes Pepcid [facility] with some relief  He can have a bowel movement 3 times a day or alternatively he will have a bowel movement every other day  REVIEW OF SYSTEMS:    CONSTITUTIONAL: Denies any fever, chills, rigors, and weight loss  HEENT: No earache or tinnitus  Denies hearing loss or visual disturbances  CARDIOVASCULAR: No chest pain or palpitations  RESPIRATORY: Denies any cough, hemoptysis, shortness of breath or dyspnea on exertion    GASTROINTESTINAL: As noted in the History of Present Illness  GENITOURINARY: No problems with urination  Denies any hematuria or dysuria  NEUROLOGIC: No dizziness or vertigo, denies headaches  MUSCULOSKELETAL: Denies any muscle or joint pain  SKIN: Denies skin rashes or itching  ENDOCRINE: Denies excessive thirst  Denies intolerance to heat or cold  PSYCHOSOCIAL: Denies depression or anxiety  Denies any recent memory loss         Historical Information   Past Medical History:   Diagnosis Date   • Asthma    • Brain tumor ([facility] [address] )    • Colon polyp    • Hyperlipidemia    • Low BP    • Prostate enlargement      Past Surgical History:   Procedure Laterality Date   • COLONOSCOPY     • EGD     • NASAL SINUS SURGERY     • NECK SURGERY  2003     Social History   Social History     Substance and Sexual Activity   Alcohol Use Not Currently     Social History     Substance and Sexual Activity   Drug Use Never     Social History     Tobacco Use   Smoking Status Every Day   • Packs/day: 1 00   • Types: Cigarettes   Smokeless Tobacco Never     Family History   Problem Relation Age of Onset   • Arthritis Mother    • Brain cancer Father        Meds/Allergies       Current Outpatient Medications:   •  cabergoline (DOSTINEX) 0 5 MG tablet  •  cetirizine (ZyrTEC) 10 mg tablet  •  Cholecalciferol 50 MCG (2000 UT) CAPS  •  EPINEPHrine (EPIPEN) 0 3 mg/0 3 mL SOAJ  •  montelukast (SINGULAIR) 10 mg tablet  •  pantoprazole (PROTONIX) 40 mg tablet  •  Probiotic Product (PROBIOTIC PO)  •  tamsulosin (FLOMAX) 0 4 mg  •  CHANTIX 1 MG tablet  •  co-enzyme Q-10 30 mg  •  Cranberry 400 MG TABS  •  ergocalciferol (ERGOCALCIFEROL) 1 25 MG (06464 UT) capsule  •  famotidine (PEPCID) 20 mg tablet  •  finasteride (PROSCAR) 5 mg tablet  •  fluticasone (FLONASE) 50 mcg/act nasal spray  •  ibuprofen (MOTRIN) 800 mg tablet  •  simvastatin (ZOCOR) 40 mg tablet    Allergies   Allergen Reactions   • Bee Pollen Other (See Comments)   • Bee Venom      Other reaction(s): Unknown           Objective     Blood "pressure 100/68, pulse 81, height 5' 2\" (1 575 m), weight 92 8 kg (204 lb 9 6 oz), SpO2 94 %  Body mass index is 37 42 kg/m²  PHYSICAL EXAM:      General Appearance:   Alert, cooperative, no distress   HEENT:   Normocephalic, atraumatic, anicteric      Neck:  Supple, symmetrical, trachea midline   Lungs:   Clear to auscultation bilaterally; no rales, rhonchi or wheezing; respirations unlabored    Heart[de-identified]   Regular rate and rhythm; no murmur, rub, or gallop  Abdomen:   Soft, non-tender, non-distended; normal bowel sounds; no masses, no organomegaly    Genitalia:   Deferred    Rectal:   Deferred    Extremities:  No cyanosis, clubbing or edema    Pulses:  2+ and symmetric    Skin:  No jaundice, rashes, or lesions    Lymph nodes:  No palpable cervical lymphadenopathy        Lab Results:   No visits with results within 1 Day(s) from this visit  Latest known visit with results is:   Hospital Outpatient Visit on 06/11/2020   Component Date Value   • Case Report 06/11/2020                      Value:Surgical Pathology Report                         Case: [id number]                                   Authorizing Provider:  Estela Manuel DO          Collected:           06/11/2020 [id number]              Ordering Location:      [facility]       Received:            06/11/2020 [facility]                                                             Pathologist:           Wesley Monterroso MD                                                         Specimens:   A) - Polyp, Colorectal, Ceecum                                                                      B) - Polyp, Colorectal, Ascending Colon X2                                                          C) - Polyp, Colorectal, Transverse Colon                                                  • Final Diagnosis 06/11/2020                      Value: This result contains rich text formatting which cannot be displayed " here    • Additional Information 06/11/2020                      Value: This result contains rich text formatting which cannot be displayed here  • Synoptic Checklist 06/11/2020                      Value:  (COLON/RECTUM POLYP FORM-GI - B)                                                                                                                 : Adenoma(s)     • Gross Description 06/11/2020                      Value: This result contains rich text formatting which cannot be displayed here  • Clinical Information 06/11/2020                      Value:Cold snare Polypectomy         Radiology Results:   No results found

## 2023-09-07 ENCOUNTER — ANESTHESIA EVENT (OUTPATIENT)
Dept: GASTROENTEROLOGY | Facility: HOSPITAL | Age: 63
End: 2023-09-07

## 2023-09-07 ENCOUNTER — ANESTHESIA (OUTPATIENT)
Dept: GASTROENTEROLOGY | Facility: HOSPITAL | Age: 63
End: 2023-09-07

## 2023-09-07 ENCOUNTER — HOSPITAL ENCOUNTER (OUTPATIENT)
Dept: GASTROENTEROLOGY | Facility: HOSPITAL | Age: 63
Setting detail: OUTPATIENT SURGERY
End: 2023-09-07
Attending: INTERNAL MEDICINE
Payer: COMMERCIAL

## 2023-09-07 VITALS
SYSTOLIC BLOOD PRESSURE: 113 MMHG | BODY MASS INDEX: 35.55 KG/M2 | DIASTOLIC BLOOD PRESSURE: 66 MMHG | OXYGEN SATURATION: 98 % | HEIGHT: 63 IN | TEMPERATURE: 97.6 F | HEART RATE: 67 BPM | RESPIRATION RATE: 21 BRPM | WEIGHT: 200.62 LBS

## 2023-09-07 DIAGNOSIS — R19.5 CHANGE IN STOOL CALIBER: ICD-10-CM

## 2023-09-07 DIAGNOSIS — R13.19 ESOPHAGEAL DYSPHAGIA: ICD-10-CM

## 2023-09-07 DIAGNOSIS — Z86.010 HISTORY OF COLON POLYPS: ICD-10-CM

## 2023-09-07 DIAGNOSIS — K21.9 GASTROESOPHAGEAL REFLUX DISEASE WITHOUT ESOPHAGITIS: ICD-10-CM

## 2023-09-07 PROBLEM — J45.909 ASTHMA: Status: ACTIVE | Noted: 2023-09-07

## 2023-09-07 PROCEDURE — 88305 TISSUE EXAM BY PATHOLOGIST: CPT | Performed by: PATHOLOGY

## 2023-09-07 RX ORDER — PROPOFOL 10 MG/ML
INJECTION, EMULSION INTRAVENOUS AS NEEDED
Status: DISCONTINUED | OUTPATIENT
Start: 2023-09-07 | End: 2023-09-07

## 2023-09-07 RX ORDER — LIDOCAINE HYDROCHLORIDE 20 MG/ML
INJECTION, SOLUTION EPIDURAL; INFILTRATION; INTRACAUDAL; PERINEURAL AS NEEDED
Status: DISCONTINUED | OUTPATIENT
Start: 2023-09-07 | End: 2023-09-07

## 2023-09-07 RX ORDER — ONDANSETRON 2 MG/ML
4 INJECTION INTRAMUSCULAR; INTRAVENOUS ONCE AS NEEDED
Status: CANCELLED | OUTPATIENT
Start: 2023-09-07

## 2023-09-07 RX ORDER — SODIUM CHLORIDE, SODIUM LACTATE, POTASSIUM CHLORIDE, CALCIUM CHLORIDE 600; 310; 30; 20 MG/100ML; MG/100ML; MG/100ML; MG/100ML
INJECTION, SOLUTION INTRAVENOUS CONTINUOUS PRN
Status: DISCONTINUED | OUTPATIENT
Start: 2023-09-07 | End: 2023-09-07

## 2023-09-07 RX ADMIN — PROPOFOL 70 MG: 10 INJECTION, EMULSION INTRAVENOUS at 09:44

## 2023-09-07 RX ADMIN — PROPOFOL 35 MG: 10 INJECTION, EMULSION INTRAVENOUS at 10:01

## 2023-09-07 RX ADMIN — PROPOFOL 70 MG: 10 INJECTION, EMULSION INTRAVENOUS at 09:43

## 2023-09-07 RX ADMIN — SODIUM CHLORIDE, SODIUM LACTATE, POTASSIUM CHLORIDE, AND CALCIUM CHLORIDE: .6; .31; .03; .02 INJECTION, SOLUTION INTRAVENOUS at 08:01

## 2023-09-07 RX ADMIN — LIDOCAINE HYDROCHLORIDE 100 MG: 20 INJECTION, SOLUTION EPIDURAL; INFILTRATION; INTRACAUDAL at 09:42

## 2023-09-07 RX ADMIN — PROPOFOL 35 MG: 10 INJECTION, EMULSION INTRAVENOUS at 09:57

## 2023-09-07 RX ADMIN — PROPOFOL 35 MG: 10 INJECTION, EMULSION INTRAVENOUS at 09:47

## 2023-09-07 RX ADMIN — PROPOFOL 35 MG: 10 INJECTION, EMULSION INTRAVENOUS at 09:54

## 2023-09-07 RX ADMIN — PROPOFOL 35 MG: 10 INJECTION, EMULSION INTRAVENOUS at 10:05

## 2023-09-07 RX ADMIN — PROPOFOL 10 MG: 10 INJECTION, EMULSION INTRAVENOUS at 10:09

## 2023-09-07 RX ADMIN — PROPOFOL 35 MG: 10 INJECTION, EMULSION INTRAVENOUS at 09:50

## 2023-09-07 NOTE — ANESTHESIA POSTPROCEDURE EVALUATION
Post-Op Assessment Note    CV Status:  Stable  Pain Score: 0    Pain management: adequate     Mental Status:  Alert and awake   Hydration Status:  Euvolemic   PONV Controlled:  Controlled   Airway Patency:  Patent      Post Op Vitals Reviewed: Yes      Staff: CRNA         No notable events documented.     /61 (09/07/23 1017)    Temp 97.6 °F (36.4 °C) (09/07/23 1017)    Pulse 67 (09/07/23 1017)   Resp 18 (09/07/23 1017)    SpO2 95 % (09/07/23 1017)

## 2023-09-07 NOTE — H&P
History and Physical - [facility]  Merline Trujillo 63 y.o. male MRN: [id number]  Gastroesophageal reflux disease, dysphagia, history of colon polyp    HPI: Radha Fields is a 63y.o. year old male who presents for evaluation of      REVIEW OF SYSTEMS: Per the HPI, and otherwise unremarkable. Historical Information   Past Medical History:   Diagnosis Date   • Asthma    • Brain tumor ([address])    • Colon polyp    • Hyperlipidemia    • Low BP    • Prostate enlargement      Past Surgical History:   Procedure Laterality Date   • COLONOSCOPY     • EGD     • NASAL SINUS SURGERY     • NECK SURGERY  2003     Social History   Social History     Substance and Sexual Activity   Alcohol Use Not Currently     Social History     Substance and Sexual Activity   Drug Use Never     Social History     Tobacco Use   Smoking Status Every Day   • Packs/day: 1.00   • Types: Cigarettes   Smokeless Tobacco Never     Family History   Problem Relation Age of Onset   • Arthritis Mother    • Brain cancer Father        Meds/Allergies     (Not in a hospital admission)      Allergies   Allergen Reactions   • Bee Pollen Other (See Comments)   • Bee Venom      Other reaction(s): Unknown       Objective     Blood pressure 98/54, pulse 65, temperature (!) 97.4 °F (36.3 °C), temperature source Temporal, resp. rate 16, height 5' 3" (1.6 m), weight 91 kg (200 lb 9.9 oz), SpO2 96 %. PHYSICAL EXAM    Gen: NAD  CV: RRR  CHEST: Clear  ABD: soft, NT/ND  EXT: no edema      ASSESSMENT/PLAN:  This is a 63y.o. year old male here for EGD with possible biopsies and dilation, colonoscopy, and he is stable and optimized for his procedure.

## 2023-09-07 NOTE — ANESTHESIA PREPROCEDURE EVALUATION
Procedure:  COLONOSCOPY  EGD    Relevant Problems   ANESTHESIA (within normal limits)      CARDIO (within normal limits)      PULMONARY   (+) Asthma        Physical Exam    Airway    Mallampati score: II  TM Distance: >3 FB  Neck ROM: full     Dental       Cardiovascular  Rhythm: regular, Rate: normal,     Pulmonary  Breath sounds clear to auscultation,     Other Findings        Anesthesia Plan  ASA Score- 2     Anesthesia Type- IV sedation with anesthesia with ASA Monitors. Additional Monitors:   Airway Plan:           Plan Factors-Exercise tolerance (METS): >4 METS. Chart reviewed. Existing labs reviewed. Patient summary reviewed. Patient is a current smoker. Patient did not smoke on day of surgery. Obstructive sleep apnea risk education given perioperatively. Induction-     Postoperative Plan-     Informed Consent- Anesthetic plan and risks discussed with patient. I personally reviewed this patient with the CRNA. Discussed and agreed on the Anesthesia Plan with the CRNA. Rhea Kaplan

## 2023-09-12 PROCEDURE — 88305 TISSUE EXAM BY PATHOLOGIST: CPT | Performed by: PATHOLOGY
